# Patient Record
Sex: FEMALE | Race: WHITE | NOT HISPANIC OR LATINO
[De-identification: names, ages, dates, MRNs, and addresses within clinical notes are randomized per-mention and may not be internally consistent; named-entity substitution may affect disease eponyms.]

---
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
## 2020-08-19 ENCOUNTER — RESULT REVIEW (OUTPATIENT)
Age: 20
End: 2020-08-19

## 2021-09-24 ENCOUNTER — RESULT REVIEW (OUTPATIENT)
Age: 21
End: 2021-09-24

## 2022-08-24 ENCOUNTER — NON-APPOINTMENT (OUTPATIENT)
Age: 22
End: 2022-08-24

## 2022-09-22 ENCOUNTER — APPOINTMENT (OUTPATIENT)
Dept: OTOLARYNGOLOGY | Facility: CLINIC | Age: 22
End: 2022-09-22

## 2022-09-22 ENCOUNTER — TRANSCRIPTION ENCOUNTER (OUTPATIENT)
Age: 22
End: 2022-09-22

## 2022-09-22 ENCOUNTER — NON-APPOINTMENT (OUTPATIENT)
Age: 22
End: 2022-09-22

## 2022-09-22 VITALS
DIASTOLIC BLOOD PRESSURE: 87 MMHG | SYSTOLIC BLOOD PRESSURE: 133 MMHG | HEIGHT: 63 IN | TEMPERATURE: 98 F | BODY MASS INDEX: 26.58 KG/M2 | WEIGHT: 150 LBS | HEART RATE: 89 BPM

## 2022-09-22 PROCEDURE — 31231 NASAL ENDOSCOPY DX: CPT

## 2022-09-22 PROCEDURE — 99204 OFFICE O/P NEW MOD 45 MIN: CPT | Mod: 25

## 2022-09-22 RX ORDER — FLUTICASONE PROPIONATE 50 UG/1
50 SPRAY, METERED NASAL DAILY
Qty: 1 | Refills: 3 | Status: ACTIVE | COMMUNITY
Start: 2022-09-22 | End: 1900-01-01

## 2022-09-22 NOTE — CONSULT LETTER
[Dear  ___] : Dear  [unfilled], [Consult Letter:] : I had the pleasure of evaluating your patient, [unfilled]. [Please see my note below.] : Please see my note below. [Consult Closing:] : Thank you very much for allowing me to participate in the care of this patient.  If you have any questions, please do not hesitate to contact me. [Sincerely,] : Sincerely, [FreeTextEntry3] : Valentin Sorenson MD\par [facility]\par Otolaryngology and Facial Plastics\par [employer], [employer]\par

## 2022-09-22 NOTE — END OF VISIT
[FreeTextEntry3] : I, Dr. Sorenson personally performed the evaluation and management (E/M) services including all necessary procedures, for this new patient. That E/M includes conducting the clinically appropriate initial history &/or exam, assessing all conditions, and establishing the plan of care. Today, my MERRITT, Jeanette Ramesh, was here to observe &/or participate in the visit & follow plan of care established by me.\par \par \par

## 2022-09-22 NOTE — HISTORY OF PRESENT ILLNESS
[de-identified] : Patient has had years of nasal obstruction and nasal congestion issues. She has had issues with nasal blockage on both sides but admits that it changes sides throughout the day. When she has a cold she cannot breathe through her nose at all. She denies nosebleeds and has only had one sinus infection overt he last few years. \par She is not currently using nasal sprays. She gets frequent ear infections as well when she has a nasal cold. \par She does not have any issues with ear pain or pressure right now, the last ear infection was a few years ago. \par

## 2022-09-22 NOTE — ASSESSMENT
[FreeTextEntry1] : Patient 22-year-old female long history of difficulty breathing endoscopically has a deviated nasal septum has a bit of attention nose also has a history of eustachian tube dysfunction and has had does have underlying allergies we sent her for a took pictures of her if she is interested in septoplasty will consider it in future we discussed also possible refinement of her nose referral she will come back to review photos she is not sure she wants to proceed in that direction.

## 2022-11-22 ENCOUNTER — RESULT REVIEW (OUTPATIENT)
Age: 22
End: 2022-11-22

## 2022-11-28 ENCOUNTER — APPOINTMENT (OUTPATIENT)
Dept: OTOLARYNGOLOGY | Facility: CLINIC | Age: 22
End: 2022-11-28

## 2022-11-28 DIAGNOSIS — J34.2 DEVIATED NASAL SEPTUM: ICD-10-CM

## 2022-11-28 DIAGNOSIS — R09.81 NASAL CONGESTION: ICD-10-CM

## 2022-11-28 PROCEDURE — 99213 OFFICE O/P EST LOW 20 MIN: CPT

## 2022-11-28 NOTE — END OF VISIT
[FreeTextEntry3] : I, Dr. Sorenson personally performed the evaluation and management (E/M) services , including all procedures, for this established patient who presents today with (a) new problem(s)/exacerbation of (an) existing condition(s). That E/M includes conducting the clinically appropriate interval history &/or exam, assessing all new/exacerbated conditions, and establishing a new plan of care. Today, my MERRITT, Jeanette Ramesh, was here to observe &/or participate in the visit & follow plan of care established by me.\par \par \par

## 2022-11-28 NOTE — HISTORY OF PRESENT ILLNESS
[de-identified] : Patient has had years of nasal obstruction and nasal congestion issues. She has had issues with nasal blockage on both sides but admits that it changes sides throughout the day. When she has a cold she cannot breathe through her nose at all. She denies nosebleeds and has only had one sinus infection overt he last few years. \par She is not currently using nasal sprays. She gets frequent ear infections as well when she has a nasal cold. \par She does not have any issues with ear pain or pressure right now, the last ear infection was a few years ago. \par

## 2022-11-28 NOTE — ASSESSMENT
[FreeTextEntry1] : Patient follows up to discuss surgery septoplasty severely deviated septum to the right also considering some cosmetic refinement follows\par Reviewed she will think about it she understands is an out-of-pocket expense.  All of her questions were answered.  Some benefits were discussed and noted.

## 2022-12-19 ENCOUNTER — OUTPATIENT (OUTPATIENT)
Dept: OUTPATIENT SERVICES | Facility: HOSPITAL | Age: 22
LOS: 1 days | End: 2022-12-19

## 2022-12-19 VITALS
TEMPERATURE: 98 F | HEART RATE: 87 BPM | OXYGEN SATURATION: 99 % | HEIGHT: 64.57 IN | SYSTOLIC BLOOD PRESSURE: 120 MMHG | DIASTOLIC BLOOD PRESSURE: 84 MMHG | WEIGHT: 171.96 LBS | RESPIRATION RATE: 16 BRPM

## 2022-12-19 DIAGNOSIS — J34.2 DEVIATED NASAL SEPTUM: ICD-10-CM

## 2022-12-19 DIAGNOSIS — Z90.89 ACQUIRED ABSENCE OF OTHER ORGANS: Chronic | ICD-10-CM

## 2022-12-19 DIAGNOSIS — R09.81 NASAL CONGESTION: ICD-10-CM

## 2022-12-19 DIAGNOSIS — Z98.890 OTHER SPECIFIED POSTPROCEDURAL STATES: Chronic | ICD-10-CM

## 2022-12-19 LAB
HCG SERPL-ACNC: <5 MIU/ML — SIGNIFICANT CHANGE UP
HCT VFR BLD CALC: 38.9 % — SIGNIFICANT CHANGE UP (ref 34.5–45)
HGB BLD-MCNC: 12.8 G/DL — SIGNIFICANT CHANGE UP (ref 11.5–15.5)
MCHC RBC-ENTMCNC: 27.6 PG — SIGNIFICANT CHANGE UP (ref 27–34)
MCHC RBC-ENTMCNC: 32.9 GM/DL — SIGNIFICANT CHANGE UP (ref 32–36)
MCV RBC AUTO: 83.8 FL — SIGNIFICANT CHANGE UP (ref 80–100)
NRBC # BLD: 0 /100 WBCS — SIGNIFICANT CHANGE UP (ref 0–0)
NRBC # FLD: 0 K/UL — SIGNIFICANT CHANGE UP (ref 0–0)
PLATELET # BLD AUTO: 282 K/UL — SIGNIFICANT CHANGE UP (ref 150–400)
RBC # BLD: 4.64 M/UL — SIGNIFICANT CHANGE UP (ref 3.8–5.2)
RBC # FLD: 12 % — SIGNIFICANT CHANGE UP (ref 10.3–14.5)
WBC # BLD: 6.13 K/UL — SIGNIFICANT CHANGE UP (ref 3.8–10.5)
WBC # FLD AUTO: 6.13 K/UL — SIGNIFICANT CHANGE UP (ref 3.8–10.5)

## 2022-12-19 NOTE — H&P PST ADULT - NSICDXPASTMEDICALHX_GEN_ALL_CORE_FT
PAST MEDICAL HISTORY:  Deviated nasal septum      PAST MEDICAL HISTORY:  Deviated nasal septum     History of ear infections

## 2022-12-19 NOTE — H&P PST ADULT - PROBLEM SELECTOR PLAN 1
Septoplasty, Bilateral Turbinectomy  1/11/22    Preop instructions given and explained.  Preop covid testing protocol reviewed with pt     CHELO GRANADOSG Septoplasty, Bilateral Turbinectomy  1/11/22    Preop instructions given and explained.  Preop covid testing protocol reviewed with pt     CBC UCG    Small oral opening, and  Mallampati 4.,  Appropriateness of surgery at [facility].  To be reviewed with Anesthesia. Anesthesia alert on chart.

## 2022-12-19 NOTE — H&P PST ADULT - ASSESSMENT
23 y/o female with hx of chronic nasal congestion, difficulty breathing through nares R>L., sinus infections and ear infections.  Dx with deviated nasal septum 22hx of chronic nasal congestion, difficulty breathing through nares R>L., sinus infections and ear infections.  Dx with deviated nasal septum. Scheduled for Septoplasty, Bilateral Turbinectomy

## 2022-12-19 NOTE — H&P PST ADULT - HISTORY OF PRESENT ILLNESS
21 y/o female with hx of chronic nasal congestion, difficulty breathing through nares R>L., sinus infections and ear infections.  Dx with deviated nasal septum 21 y/o female with hx of chronic nasal congestion, difficulty breathing through nares R>L., sinus infections and ear infections.  Dx with deviated nasal septum. Scheduled for Septoplasty, Bilateral Turbinectomy

## 2022-12-19 NOTE — H&P PST ADULT - ENMT COMMENTS
chronic nasal congestion, difficulty breathing through nares R>L., sinus infections and ear infections.  Dx with deviated nasal septum hx tonsillectomy 2007, myringotomy and tubes 2004;  Pt reports chronic nasal congestion, difficulty breathing through nares R>L., sinus infections and ear infections.  Dx with deviated nasal septum Small oral opening.  Uvula not visualized on phonation; Mallampati 4.  Deviated nasal septum per dx

## 2022-12-19 NOTE — H&P PST ADULT - RESPIRATORY
clear to auscultation bilaterally/no wheezes/no rales/no rhonchi/no respiratory distress/respirations non-labored

## 2022-12-19 NOTE — H&P PST ADULT - MUSCULOSKELETAL
negative no calf tenderness/strength 5/5 bilateral upper extremities/strength 5/5 bilateral lower extremities

## 2023-01-07 ENCOUNTER — NON-APPOINTMENT (OUTPATIENT)
Age: 23
End: 2023-01-07

## 2023-01-10 ENCOUNTER — TRANSCRIPTION ENCOUNTER (OUTPATIENT)
Age: 23
End: 2023-01-10

## 2023-01-11 ENCOUNTER — TRANSCRIPTION ENCOUNTER (OUTPATIENT)
Age: 23
End: 2023-01-11

## 2023-01-11 ENCOUNTER — OUTPATIENT (OUTPATIENT)
Dept: OUTPATIENT SERVICES | Facility: HOSPITAL | Age: 23
LOS: 1 days | Discharge: HOME | End: 2023-01-11
Payer: COMMERCIAL

## 2023-01-11 ENCOUNTER — RESULT REVIEW (OUTPATIENT)
Age: 23
End: 2023-01-11

## 2023-01-11 ENCOUNTER — APPOINTMENT (OUTPATIENT)
Dept: OTOLARYNGOLOGY | Facility: AMBULATORY SURGERY CENTER | Age: 23
End: 2023-01-11

## 2023-01-11 VITALS
TEMPERATURE: 98 F | HEART RATE: 87 BPM | DIASTOLIC BLOOD PRESSURE: 91 MMHG | RESPIRATION RATE: 16 BRPM | SYSTOLIC BLOOD PRESSURE: 128 MMHG | OXYGEN SATURATION: 98 %

## 2023-01-11 VITALS
TEMPERATURE: 98 F | DIASTOLIC BLOOD PRESSURE: 88 MMHG | HEART RATE: 93 BPM | RESPIRATION RATE: 16 BRPM | SYSTOLIC BLOOD PRESSURE: 131 MMHG | OXYGEN SATURATION: 99 % | WEIGHT: 171.96 LBS

## 2023-01-11 DIAGNOSIS — Z98.890 OTHER SPECIFIED POSTPROCEDURAL STATES: Chronic | ICD-10-CM

## 2023-01-11 DIAGNOSIS — Z90.89 ACQUIRED ABSENCE OF OTHER ORGANS: Chronic | ICD-10-CM

## 2023-01-11 DIAGNOSIS — R09.81 NASAL CONGESTION: ICD-10-CM

## 2023-01-11 PROCEDURE — 30140 RESECT INFERIOR TURBINATE: CPT | Mod: 50

## 2023-01-11 PROCEDURE — 88304 TISSUE EXAM BY PATHOLOGIST: CPT | Mod: 26

## 2023-01-11 PROCEDURE — 30520 REPAIR OF NASAL SEPTUM: CPT

## 2023-01-11 PROCEDURE — 88311 DECALCIFY TISSUE: CPT | Mod: 26

## 2023-01-11 RX ORDER — FLUTICASONE PROPIONATE 220 MCG
1 AEROSOL WITH ADAPTER (GRAM) INHALATION
Qty: 0 | Refills: 0 | DISCHARGE

## 2023-01-11 RX ORDER — ACETAMINOPHEN WITH CODEINE 300MG-30MG
1 TABLET ORAL
Qty: 8 | Refills: 0
Start: 2023-01-11 | End: 2023-01-12

## 2023-01-11 RX ORDER — ACETAMINOPHEN AND CODEINE 300; 30 MG/1; MG/1
300-30 TABLET ORAL
Qty: 8 | Refills: 0 | Status: ACTIVE | COMMUNITY
Start: 2023-01-11 | End: 1900-01-01

## 2023-01-11 RX ORDER — AZITHROMYCIN 500 MG/1
1 TABLET, FILM COATED ORAL
Qty: 6 | Refills: 0
Start: 2023-01-11 | End: 2023-01-15

## 2023-01-11 RX ORDER — AZITHROMYCIN 250 MG/1
250 TABLET, FILM COATED ORAL
Qty: 1 | Refills: 0 | Status: ACTIVE | COMMUNITY
Start: 2023-01-11 | End: 1900-01-01

## 2023-01-11 RX ORDER — NORETHINDRONE AND ETHINYL ESTRADIOL 0.4-0.035
1 KIT ORAL
Qty: 0 | Refills: 0 | DISCHARGE

## 2023-01-11 NOTE — ASU DISCHARGE PLAN (ADULT/PEDIATRIC) - CARE PROVIDER_API CALL
Valentin Sorenson (MD)  Facial Plastic and Reconstructive Surgery; Otolaryngology  [address] [place] [address], [facility] [address]  [place] [address]  Phone: [phone]  Fax: [phone]  Follow Up Time:

## 2023-01-11 NOTE — ASU DISCHARGE PLAN (ADULT/PEDIATRIC) - NS MD DC FALL RISK RISK
For information on Fall & Injury Prevention, visit: [email or web address] OR  [email or web address] OR  https://www.cdc.gov/steadi/patient.html

## 2023-01-12 ENCOUNTER — APPOINTMENT (OUTPATIENT)
Dept: OTOLARYNGOLOGY | Facility: CLINIC | Age: 23
End: 2023-01-12
Payer: COMMERCIAL

## 2023-01-12 VITALS
DIASTOLIC BLOOD PRESSURE: 94 MMHG | HEIGHT: 63 IN | TEMPERATURE: 98.2 F | SYSTOLIC BLOOD PRESSURE: 141 MMHG | WEIGHT: 150 LBS | BODY MASS INDEX: 26.58 KG/M2 | HEART RATE: 96 BPM

## 2023-01-12 PROCEDURE — 99024 POSTOP FOLLOW-UP VISIT: CPT

## 2023-01-12 PROCEDURE — 31237 NSL/SINS NDSC SURG BX POLYPC: CPT | Mod: 50,58

## 2023-01-12 NOTE — HISTORY OF PRESENT ILLNESS
[de-identified] : Patient is 1 day s/p septoplasty and is overall having moderate nasal congestion bilaterally. SHe started her antibitoics. She had to change the tip dressing several times overnight for bleeding.

## 2023-01-17 ENCOUNTER — APPOINTMENT (OUTPATIENT)
Dept: OTOLARYNGOLOGY | Facility: CLINIC | Age: 23
End: 2023-01-17
Payer: COMMERCIAL

## 2023-01-17 VITALS
DIASTOLIC BLOOD PRESSURE: 88 MMHG | WEIGHT: 170 LBS | SYSTOLIC BLOOD PRESSURE: 130 MMHG | TEMPERATURE: 98 F | BODY MASS INDEX: 29.02 KG/M2 | HEIGHT: 64 IN | HEART RATE: 76 BPM

## 2023-01-17 PROCEDURE — 99024 POSTOP FOLLOW-UP VISIT: CPT

## 2023-01-17 PROCEDURE — 31237 NSL/SINS NDSC SURG BX POLYPC: CPT | Mod: 50,58

## 2023-01-17 NOTE — HISTORY OF PRESENT ILLNESS
[de-identified] : 6 days s/p septoplasty and is having moderate nsal congestion bilaterally. She finished the antibitoics as prescribed. Denies nosebleeds. Still has mild discomfort

## 2023-01-18 ENCOUNTER — NON-APPOINTMENT (OUTPATIENT)
Age: 23
End: 2023-01-18

## 2023-01-18 PROBLEM — J34.2 DEVIATED NASAL SEPTUM: Chronic | Status: ACTIVE | Noted: 2022-12-19

## 2023-01-18 PROBLEM — Z86.69 PERSONAL HISTORY OF OTHER DISEASES OF THE NERVOUS SYSTEM AND SENSE ORGANS: Chronic | Status: ACTIVE | Noted: 2022-12-19

## 2023-01-18 LAB — SURGICAL PATHOLOGY STUDY: SIGNIFICANT CHANGE UP

## 2023-02-01 ENCOUNTER — APPOINTMENT (OUTPATIENT)
Dept: OTOLARYNGOLOGY | Facility: CLINIC | Age: 23
End: 2023-02-01
Payer: COMMERCIAL

## 2023-02-01 VITALS
HEIGHT: 64 IN | SYSTOLIC BLOOD PRESSURE: 126 MMHG | DIASTOLIC BLOOD PRESSURE: 86 MMHG | WEIGHT: 170 LBS | HEART RATE: 66 BPM | BODY MASS INDEX: 29.02 KG/M2 | OXYGEN SATURATION: 99 %

## 2023-02-01 DIAGNOSIS — Z98.890 OTHER SPECIFIED POSTPROCEDURAL STATES: ICD-10-CM

## 2023-02-01 DIAGNOSIS — J34.89 OTHER SPECIFIED DISORDERS OF NOSE AND NASAL SINUSES: ICD-10-CM

## 2023-02-01 PROCEDURE — 99024 POSTOP FOLLOW-UP VISIT: CPT

## 2023-02-01 PROCEDURE — 31237 NSL/SINS NDSC SURG BX POLYPC: CPT | Mod: 50,58

## 2023-02-01 NOTE — HISTORY OF PRESENT ILLNESS
[de-identified] : PAtient is here 3 weeks s/p septoplasty turbinoplasty. She is not having any nosebleeds, overall breathing better through both nasal cavities. SHe denies sinus pressure or nasal pain.

## 2023-02-01 NOTE — REASON FOR VISIT
[Post-Operative Visit] : a post-operative visit [FreeTextEntry2] : 3 weeks s/p septoplasty and turbs.

## 2023-02-01 NOTE — ASSESSMENT
[FreeTextEntry1] : Patient 3 weeks status post septal TURBs doing really well a lot of debris in the left nasal cavity which was suctioned out she is breathing tremendously better she is happy she will follow-up and see us in 3 months.

## 2024-02-12 NOTE — H&P PST ADULT - NSALCOHOLAMT_GEN_A_CORE_SD
Instructions: This plan will send the code FBSE to the PM system.  DO NOT or CHANGE the price. Detail Level: Simple Price (Do Not Change): 0.00 1-2 drinks

## (undated) DEVICE — PACK SMR

## (undated) DEVICE — SUT SILK 3-0 18" FS-1

## (undated) DEVICE — DRSG TELFA 3 X 8

## (undated) DEVICE — SOL IRR POUR NS 0.9% 500ML

## (undated) DEVICE — SUT PLAIN GUT 4-0 18" SC-1

## (undated) DEVICE — GLV 7.5 PROTEXIS (WHITE)

## (undated) DEVICE — SYR LUER LOK 5CC

## (undated) DEVICE — PACKING GAUZE PLAIN 0.5"

## (undated) DEVICE — POSITIONER STRAP ARMBOARD VELCRO TS-30

## (undated) DEVICE — SUT CHROMIC 4-0 18" G-2

## (undated) DEVICE — CATH IV SAFE INSYTE 14G X 1.75" (ORANGE)

## (undated) DEVICE — VISITEC 4X4

## (undated) DEVICE — WARMING BLANKET LOWER ADULT

## (undated) DEVICE — LABELS BLANK W PEN

## (undated) DEVICE — MARKING PEN W RULER / LABELS

## (undated) DEVICE — MARKING PEN W RULER

## (undated) DEVICE — DRSG SPLINT INTRA NASAL .5MM OVERSIZE THICK

## (undated) DEVICE — SUT ETHILON 3-0 18" FS-1

## (undated) DEVICE — DRSG MEROCEL 2000 WITH STRING 8CM

## (undated) DEVICE — CANISTER DISPOSABLE THIN WALL 3000CC

## (undated) DEVICE — ELCTR GROUNDING PAD ADULT COVIDIEN